# Patient Record
Sex: MALE | Race: ASIAN | NOT HISPANIC OR LATINO
[De-identification: names, ages, dates, MRNs, and addresses within clinical notes are randomized per-mention and may not be internally consistent; named-entity substitution may affect disease eponyms.]

---

## 2019-09-16 PROBLEM — Z00.00 ENCOUNTER FOR PREVENTIVE HEALTH EXAMINATION: Status: ACTIVE | Noted: 2019-09-16

## 2019-09-18 ENCOUNTER — APPOINTMENT (OUTPATIENT)
Dept: COLORECTAL SURGERY | Facility: CLINIC | Age: 64
End: 2019-09-18

## 2020-01-13 ENCOUNTER — APPOINTMENT (OUTPATIENT)
Dept: COLORECTAL SURGERY | Facility: CLINIC | Age: 65
End: 2020-01-13
Payer: MEDICARE

## 2020-01-13 VITALS
BODY MASS INDEX: 26.68 KG/M2 | HEIGHT: 66 IN | HEART RATE: 88 BPM | DIASTOLIC BLOOD PRESSURE: 60 MMHG | SYSTOLIC BLOOD PRESSURE: 134 MMHG | WEIGHT: 166 LBS | TEMPERATURE: 98.7 F

## 2020-01-13 DIAGNOSIS — A63.0 ANOGENITAL (VENEREAL) WARTS: ICD-10-CM

## 2020-01-13 DIAGNOSIS — Z80.0 FAMILY HISTORY OF MALIGNANT NEOPLASM OF DIGESTIVE ORGANS: ICD-10-CM

## 2020-01-13 DIAGNOSIS — Z86.79 PERSONAL HISTORY OF OTHER DISEASES OF THE CIRCULATORY SYSTEM: ICD-10-CM

## 2020-01-13 DIAGNOSIS — Z87.39 PERSONAL HISTORY OF OTHER DISEASES OF THE MUSCULOSKELETAL SYSTEM AND CONNECTIVE TISSUE: ICD-10-CM

## 2020-01-13 PROBLEM — Z00.00 ENCOUNTER FOR PREVENTIVE HEALTH EXAMINATION: Noted: 2020-01-13

## 2020-01-13 PROCEDURE — 99212 OFFICE O/P EST SF 10 MIN: CPT | Mod: 25

## 2020-01-13 PROCEDURE — 46600 DIAGNOSTIC ANOSCOPY SPX: CPT

## 2020-01-13 RX ORDER — ALLOPURINOL 100 MG/1
100 TABLET ORAL
Refills: 0 | Status: ACTIVE | COMMUNITY

## 2020-01-13 RX ORDER — ATORVASTATIN CALCIUM 20 MG/1
20 TABLET, FILM COATED ORAL
Refills: 0 | Status: ACTIVE | COMMUNITY

## 2020-01-13 RX ORDER — AMLODIPINE BESYLATE 10 MG/1
10 TABLET ORAL
Refills: 0 | Status: ACTIVE | COMMUNITY

## 2020-01-13 NOTE — HISTORY OF PRESENT ILLNESS
[FreeTextEntry1] : 64 year old male with hx condyloma in  2016 and 2017.    HIV negative.  \par \par No problems with pain, itching or bleeding.  Recent colonoscopy was Nvember 2019.

## 2020-01-13 NOTE — PHYSICAL EXAM
[Normal rectal exam] : exam was normal [Excoriation] : no perianal excoriation [Fistula] : no fistulas [Wart] : no warts [Ulcer ___ cm] : no ulcers [Nonprolapsing] : a nonprolapsing (grade I) [Tender, Swollen] : nontender, non-swollen [Thrombosed] : that was not thrombosed [Skin Tags] : residual hemorrhoidal skin tags were noted [Normal] : was normal [Gross Blood] : no gross blood [de-identified] : ext: no lesions seen, no tags or other [de-identified] : digital: no masses, tone WNL., no lesions [de-identified] : anoscopy: 4 passes adult scope: no lesions seen

## 2020-02-20 ENCOUNTER — APPOINTMENT (OUTPATIENT)
Dept: THORACIC SURGERY | Facility: CLINIC | Age: 65
End: 2020-02-20
Payer: MEDICARE

## 2020-02-20 VITALS
SYSTOLIC BLOOD PRESSURE: 145 MMHG | OXYGEN SATURATION: 96 % | HEART RATE: 75 BPM | DIASTOLIC BLOOD PRESSURE: 74 MMHG | BODY MASS INDEX: 25.55 KG/M2 | RESPIRATION RATE: 17 BRPM | HEIGHT: 66 IN | WEIGHT: 159 LBS | TEMPERATURE: 98.8 F

## 2020-02-20 DIAGNOSIS — Z86.39 PERSONAL HISTORY OF OTHER ENDOCRINE, NUTRITIONAL AND METABOLIC DISEASE: ICD-10-CM

## 2020-02-20 DIAGNOSIS — Z80.0 FAMILY HISTORY OF MALIGNANT NEOPLASM OF DIGESTIVE ORGANS: ICD-10-CM

## 2020-02-20 PROCEDURE — 99205 OFFICE O/P NEW HI 60 MIN: CPT

## 2020-02-24 PROBLEM — Z80.0 FAMILY HISTORY OF LIVER CANCER: Status: ACTIVE | Noted: 2020-02-24

## 2020-02-24 PROBLEM — Z86.39 HISTORY OF HYPERLIPIDEMIA: Status: RESOLVED | Noted: 2020-02-24 | Resolved: 2020-02-24

## 2020-02-24 RX ORDER — LOSARTAN POTASSIUM 50 MG/1
50 TABLET, FILM COATED ORAL
Refills: 0 | Status: ACTIVE | COMMUNITY

## 2020-02-24 NOTE — PHYSICAL EXAM
[General Appearance - In No Acute Distress] : in no acute distress [General Appearance - Alert] : alert [Sclera] : the sclera and conjunctiva were normal [PERRL With Normal Accommodation] : pupils were equal in size, round, and reactive to light [Extraocular Movements] : extraocular movements were intact [Outer Ear] : the ears and nose were normal in appearance [Oropharynx] : the oropharynx was normal [Neck Appearance] : the appearance of the neck was normal [Neck Cervical Mass (___cm)] : no neck mass was observed [Jugular Venous Distention Increased] : there was no jugular-venous distention [Thyroid Nodule] : there were no palpable thyroid nodules [Thyroid Diffuse Enlargement] : the thyroid was not enlarged [Auscultation Breath Sounds / Voice Sounds] : lungs were clear to auscultation bilaterally [Heart Rate And Rhythm] : heart rate was normal and rhythm regular [Heart Sounds] : normal S1 and S2 [Heart Sounds Gallop] : no gallops [Murmurs] : no murmurs [Heart Sounds Pericardial Friction Rub] : no pericardial rub [Examination Of The Chest] : the chest was normal in appearance [Chest Visual Inspection Thoracic Asymmetry] : no chest asymmetry [Diminished Respiratory Excursion] : normal chest expansion [2+] : left 2+ [Breast Appearance] : normal in appearance [Breast Palpation Mass] : no palpable masses [Bowel Sounds] : normal bowel sounds [Abdomen Tenderness] : non-tender [Abdomen Soft] : soft [Abdomen Mass (___ Cm)] : no abdominal mass palpated [Cervical Lymph Nodes Enlarged Anterior Bilaterally] : anterior cervical [Cervical Lymph Nodes Enlarged Posterior Bilaterally] : posterior cervical [Supraclavicular Lymph Nodes Enlarged Bilaterally] : supraclavicular [No CVA Tenderness] : no ~M costovertebral angle tenderness [No Spinal Tenderness] : no spinal tenderness [Nail Clubbing] : no clubbing  or cyanosis of the fingernails [Abnormal Walk] : normal gait [Musculoskeletal - Swelling] : no joint swelling seen [Motor Tone] : muscle strength and tone were normal [Skin Color & Pigmentation] : normal skin color and pigmentation [Skin Turgor] : normal skin turgor [] : no rash [Deep Tendon Reflexes (DTR)] : deep tendon reflexes were 2+ and symmetric [Sensation] : the sensory exam was normal to light touch and pinprick [No Focal Deficits] : no focal deficits [Oriented To Time, Place, And Person] : oriented to person, place, and time [Impaired Insight] : insight and judgment were intact [Affect] : the affect was normal [FreeTextEntry1] : deferred

## 2020-02-24 NOTE — HISTORY OF PRESENT ILLNESS
[FreeTextEntry1] : Mr. JACKI MCCLELLAN, 64 year old male, never smoker, w/ hx of HTN, HLD, who presented to PCP for annual physical exam, found +Occult, referred to GI Dr. Kyle Watkins for work-up.\par \par Colonoscopy on 11/13/19 showed sessile polyp s/p polypectomy x 2. Path showed tubular adenoma x 2.\par \par EGD on 1/10/2020 showed a small hiatal hernia; a 0.5cm ulcer vs diverticulum w/ food residual in the esophagus. Path showed minimal non-specific chronic inflammation, negative for H. Pylori, intestinal metaplasia dysplasia or malignancy.\par \par CT Chest w/ contrast on 2/4/2020:\par - a well-defined 1.4cm Lt lobe thyroid nodule, likely benign\par - a 1.3cm air-containing structure to the Lt of the trachea and esophagus at the level of the clavicles (2:30), containing air and mottled debris, c/w a paratracheal air cyst\par - a small hiatal hernia\par \par Patient is here today for CT Sx consultation, referred by Dr. Kyle Watkins. Denies SOB, CP, cough, dysphagia or acid reflux.\par

## 2020-02-24 NOTE — DATA REVIEWED
[FreeTextEntry1] : Colonoscopy on 11/13/19 showed sessile polyp s/p polypectomy x 2. Path showed tubular adenoma x 2.\par \par EGD on 1/10/2020 showed a small hiatal hernia; a 0.5cm ulcer vs diverticulum w/ food residual in the esophagus. Path showed minimal non-specific chronic inflammation, negative for H. Pylori, intestinal metaplasia dysplasia or malignancy.\par \par CT Chest w/ contrast on 2/4/2020:\par - a well-defined 1.4cm Lt lobe thyroid nodule, likely benign\par - a 1.3cm air-containing structure to the Lt of the trachea and esophagus at the level of the clavicles (2:30), containing air and mottled debris, c/w a paratracheal air cyst\par - a small hiatal hernia

## 2020-02-24 NOTE — ASSESSMENT
[FreeTextEntry1] : Mr. JACKI MCCLELLAN, 64 year old male, never smoker, w/ hx of HTN, HLD, who presented to PCP for annual physical exam, found +Occult, referred to GI Dr. Kyle Watkins for work-up.\par \par Colonoscopy on 11/13/19 showed sessile polyp s/p polypectomy x 2. Path showed tubular adenoma x 2.\par \par EGD on 1/10/2020 showed a small hiatal hernia; a 0.5cm ulcer vs diverticulum w/ food residual in the esophagus. Path showed minimal non-specific chronic inflammation, negative for H. Pylori, intestinal metaplasia dysplasia or malignancy.\par \par CT Chest w/ contrast on 2/4/2020:\par - a well-defined 1.4cm Lt lobe thyroid nodule, likely benign\par - a 1.3cm air-containing structure to the Lt of the trachea and esophagus at the level of the clavicles (2:30), containing air and mottled debris, c/w a paratracheal air cyst\par - a small hiatal hernia\par \par I have reviewed the patient's medical records and diagnostic images at time of this office consultation and have made the following recommendation:\par 1. All imaging studies reviewed, I recommended an EGD on 2/27/2020. Risks and benefits and alternatives explained to patient, all questions answered, patient agreed to proceed with procedure.\par 2. Barium Esophagram\par \par \par I personally performed the services described in the documentation, reviewed the documentation recorded by the scribe in my presence and it accurately and completely records my words and actions.\par \par I, Genoveva Boykin NP, am scribing for and the presence of ALONDRA Sawyer, the following sections HISTORY OF PRESENT ILLNESS, PAST MEDICAL/FAMILY/SOCIAL HISTORY; REVIEW OF SYSTEMS; VITAL SIGNS; PHYSICAL EXAM; DISPOSITION.\par \par

## 2020-02-24 NOTE — CONSULT LETTER
[Dear  ___] : Dear  [unfilled], [Consult Letter:] : I had the pleasure of evaluating your patient, [unfilled]. [( Thank you for referring [unfilled] for consultation for _____ )] : Thank you for referring [unfilled] for consultation for [unfilled] [Please see my note below.] : Please see my note below. [Consult Closing:] : Thank you very much for allowing me to participate in the care of this patient.  If you have any questions, please do not hesitate to contact me. [Sincerely,] : Sincerely, [DrSoumya  ___] : Dr. SEVERINO [FreeTextEntry2] : Kyle Watkins MD (GI/Referring)\osito Bennett MD (PCP) [FreeTextEntry3] : Bin Echeverria MD, MPH \par System Director of Thoracic Surgery \par Director of Comprehensive Lung and Foregut [place] \par Professor Cardiovascular & Thoracic Surgery  \par [employer] [facility]\par

## 2020-02-26 NOTE — ASU PATIENT PROFILE, ADULT - PMH
Essential hypertension    Lead-induced gout of right shoulder, unspecified chronicity, subsequent encounter    Pure hypertriglyceridemia

## 2020-02-27 ENCOUNTER — APPOINTMENT (OUTPATIENT)
Dept: THORACIC SURGERY | Facility: HOSPITAL | Age: 65
End: 2020-02-27

## 2020-02-27 ENCOUNTER — OUTPATIENT (OUTPATIENT)
Dept: OUTPATIENT SERVICES | Facility: HOSPITAL | Age: 65
LOS: 1 days | Discharge: HOME | End: 2020-02-27
Payer: MEDICARE

## 2020-02-27 VITALS — SYSTOLIC BLOOD PRESSURE: 120 MMHG | DIASTOLIC BLOOD PRESSURE: 75 MMHG

## 2020-02-27 VITALS
DIASTOLIC BLOOD PRESSURE: 64 MMHG | TEMPERATURE: 99 F | RESPIRATION RATE: 14 BRPM | WEIGHT: 160.06 LBS | OXYGEN SATURATION: 96 % | HEIGHT: 66 IN | HEART RATE: 64 BPM | SYSTOLIC BLOOD PRESSURE: 145 MMHG

## 2020-02-27 DIAGNOSIS — H33.051 TOTAL RETINAL DETACHMENT, RIGHT EYE: Chronic | ICD-10-CM

## 2020-02-27 DIAGNOSIS — K44.9 DIAPHRAGMATIC HERNIA WITHOUT OBSTRUCTION OR GANGRENE: ICD-10-CM

## 2020-02-27 PROCEDURE — 43235 EGD DIAGNOSTIC BRUSH WASH: CPT

## 2020-02-27 RX ORDER — SODIUM CHLORIDE 9 MG/ML
1000 INJECTION, SOLUTION INTRAVENOUS
Refills: 0 | Status: DISCONTINUED | OUTPATIENT
Start: 2020-02-27 | End: 2020-03-13

## 2020-03-11 PROBLEM — K22.9 ESOPHAGEAL LESION: Status: ACTIVE | Noted: 2020-02-24

## 2020-03-12 ENCOUNTER — APPOINTMENT (OUTPATIENT)
Dept: THORACIC SURGERY | Facility: CLINIC | Age: 65
End: 2020-03-12
Payer: MEDICARE

## 2020-03-12 VITALS
TEMPERATURE: 98.1 F | DIASTOLIC BLOOD PRESSURE: 77 MMHG | SYSTOLIC BLOOD PRESSURE: 146 MMHG | WEIGHT: 163 LBS | BODY MASS INDEX: 26.31 KG/M2 | RESPIRATION RATE: 17 BRPM | HEART RATE: 74 BPM | OXYGEN SATURATION: 95 %

## 2020-03-12 DIAGNOSIS — K22.9 DISEASE OF ESOPHAGUS, UNSPECIFIED: ICD-10-CM

## 2020-03-12 PROCEDURE — 99213 OFFICE O/P EST LOW 20 MIN: CPT

## 2020-03-18 NOTE — HISTORY OF PRESENT ILLNESS
[FreeTextEntry1] : Mr. JACKI MCCLELLAN, 64 year old male, never smoker, w/ hx of HTN, HLD, who presented to PCP for annual physical exam, found +Occult, referred to GI Dr. Kyle Watkins for work-up.\par \par Colonoscopy on 11/13/19 showed sessile polyp s/p polypectomy x 2. Path showed tubular adenoma x 2.\par \par EGD on 1/10/2020 showed a small hiatal hernia; a 0.5cm ulcer vs diverticulum w/ food residual in the esophagus. Path showed minimal non-specific chronic inflammation, negative for H. Pylori, intestinal metaplasia dysplasia or malignancy.\par \par CT Chest w/ contrast on 2/4/2020:\par - a well-defined 1.4cm Lt lobe thyroid nodule, likely benign\par - a 1.3cm air-containing structure to the Lt of the trachea and esophagus at the level of the clavicles (2:30), containing air and mottled debris, c/w a paratracheal air cyst\par - a small hiatal hernia\par \par Barium Esophagram on 2/24/2020 showed focal contrast filled outpouching along the left lateral aspect of the cervical esophagus, most c/w a [place]-Nehemiah diverticulum; esophageal hypo-/dysmotility w/ a small hiatal hernia.\par \par EGD on 2/27/2020 showed the cricopharyngeus located at 15cm from incisor, a 0.7-0.8cm esophageal diverticula (w/ part of medications, pills, food material impacted) noted right below the cricopharyngeus muscle at 17cm; no evidence of Zenker's diverticulum; a Hill's Grade II hiatal hernia. Findings c/w a [place]-Nehemiah diverticula.\par \par Patient is here today for a follow up. Pt reports doing well, denies dysphagia, N/V or pain.

## 2020-03-18 NOTE — DATA REVIEWED
[FreeTextEntry1] : Barium Esophagram on 2/24/2020 showed focal contrast filled outpouching along the left lateral aspect of the cervical esophagus, most c/w a [place]-Nehemiah diverticulum; esophageal hypo-/dysmotility w/ a small hiatal hernia.\par \par EGD on 2/27/2020 showed the cricopharyngeus located at 15cm from incisor, a 0.7-0.8cm esophageal diverticula (w/ part of medications, pills, food material impacted) noted right below the cricopharyngeus muscle at 17cm; no evidence of Zenker's diverticulum; a Hill's Grade II hiatal hernia. Findings c/w a [place]-Nehemiah diverticula.

## 2020-03-18 NOTE — CONSULT LETTER
[Dear  ___] : Dear  [unfilled], [Consult Letter:] : I had the pleasure of evaluating your patient, [unfilled]. [( Thank you for referring [unfilled] for consultation for _____ )] : Thank you for referring [unfilled] for consultation for [unfilled] [Please see my note below.] : Please see my note below. [Consult Closing:] : Thank you very much for allowing me to participate in the care of this patient.  If you have any questions, please do not hesitate to contact me. [Sincerely,] : Sincerely, [DrSoumya  ___] : Dr. SEVERINO [FreeTextEntry2] : Kyle Watkins MD (GI/Referring)\osito Bennett MD (PCP)  [FreeTextEntry3] : iBn Echeverria MD, MPH \par System Director of Thoracic Surgery \par Director of Comprehensive Lung and Foregut [place] \par Professor Cardiovascular & Thoracic Surgery  \par [employer] [employer] at [facility] [place]\par

## 2020-03-18 NOTE — ASSESSMENT
[FreeTextEntry1] : Mr. JACKI MCCLELLAN, 64 year old male, never smoker, w/ hx of HTN, HLD, who presented to PCP for annual physical exam, found +Occult, referred to GI Dr. Kyle Watkins for work-up.\par \par Colonoscopy on 11/13/19 showed sessile polyp s/p polypectomy x 2. Path showed tubular adenoma x 2.\par \par EGD on 1/10/2020 showed a small hiatal hernia; a 0.5cm ulcer vs diverticulum w/ food residual in the esophagus. Path showed minimal non-specific chronic inflammation, negative for H. Pylori, intestinal metaplasia dysplasia or malignancy.\par \par CT Chest w/ contrast on 2/4/2020:\par - a well-defined 1.4cm Lt lobe thyroid nodule, likely benign\par - a 1.3cm air-containing structure to the Lt of the trachea and esophagus at the level of the clavicles (2:30), containing air and mottled debris, c/w a paratracheal air cyst\par - a small hiatal hernia\par \par Barium Esophagram on 2/24/2020 showed focal contrast filled outpouching along the left lateral aspect of the cervical esophagus, most c/w a [place]-Nehemiah diverticulum; esophageal hypo-/dysmotility w/ a small hiatal hernia.\par \par EGD on 2/27/2020 showed the cricopharyngeus located at 15cm from incisor, a 0.7-0.8cm esophageal diverticula (w/ part of medications, pills, food material impacted) noted right below the cricopharyngeus muscle at 17cm; no evidence of Zenker's diverticulum; a Hill's Grade II hiatal hernia. Findings c/w a [place]-Nehemiah diverticula.\par \par I have reviewed the patient's medical records and diagnostic images at time of this office consultation and have made the following recommendation:\par 1. Barium and EGD reviewed with pt. Pt is currently asymptomatic, no surgical intervention is warranted at this time. May follow up as needed. \par \par \par I personally performed the services described in the documentation, reviewed the documentation recorded by the scribe in my presence and it accurately and completely records my words and actions. \par \par I, Candis Andrew, NP, am scribing for and the presence of Dr. Bin Echeverria the following sections, HISTORY OF PRESENT ILLNESS, PAST MEDICAL/FAMILY/SOCIAL HISTORY; REVIEW OF SYSTEMS; VITAL SIGNS; PHYSICAL EXAM; DISPOSITION.\par \par \par

## 2020-07-31 ENCOUNTER — APPOINTMENT (OUTPATIENT)
Dept: COLORECTAL SURGERY | Facility: CLINIC | Age: 65
End: 2020-07-31
Payer: MEDICARE

## 2020-07-31 VITALS
HEART RATE: 75 BPM | OXYGEN SATURATION: 97 % | DIASTOLIC BLOOD PRESSURE: 71 MMHG | WEIGHT: 158 LBS | SYSTOLIC BLOOD PRESSURE: 130 MMHG | HEIGHT: 66 IN | BODY MASS INDEX: 25.39 KG/M2 | TEMPERATURE: 98.6 F

## 2020-07-31 DIAGNOSIS — A63.0 ANOGENITAL (VENEREAL) WARTS: ICD-10-CM

## 2020-07-31 PROCEDURE — 46600 DIAGNOSTIC ANOSCOPY SPX: CPT

## 2020-07-31 PROCEDURE — 99212 OFFICE O/P EST SF 10 MIN: CPT | Mod: 25

## 2020-07-31 NOTE — PHYSICAL EXAM
[Excoriation] : no perianal excoriation [Fistula] : no fistulas [None] : no anal fissures seen [Wart] : no warts [Ulcer ___ cm] : no ulcers [Thrombosed] : that was not thrombosed [Tender, Swollen] : nontender, non-swollen [Skin Tags] : there were no residual hemorrhoidal skin tags seen [Normal] : was normal [de-identified] : no external lesions [Stool Sample Taken] : no stool obtained on rectal exam [Gross Blood] : no gross blood [de-identified] : digital: no masses, tone wnl.    anoscopy: no lesions seen.  3-4 passes with adult anoscope

## 2022-02-09 NOTE — REASON FOR VISIT
[FreeTextEntry1] : Pt would like to proceed with tonsillectomy. Risks, benefits and alternatives were explained to the patient. They included but were not limited to bleeding, infection, persistent symptoms, dehydration, numbness, change in voice, change in swallowing, need for additional surgery, etc.. All questions were answered at this time.\par 
[Consultation] : a consultation visit